# Patient Record
Sex: MALE | Race: WHITE | NOT HISPANIC OR LATINO | Employment: OTHER | URBAN - METROPOLITAN AREA
[De-identification: names, ages, dates, MRNs, and addresses within clinical notes are randomized per-mention and may not be internally consistent; named-entity substitution may affect disease eponyms.]

---

## 2022-12-29 ENCOUNTER — HOSPITAL ENCOUNTER (OUTPATIENT)
Dept: CARDIOLOGY | Facility: HOSPITAL | Age: 77
Discharge: HOME | End: 2022-12-29
Payer: MEDICARE

## 2022-12-29 ENCOUNTER — LAB (OUTPATIENT)
Dept: LAB | Facility: HOSPITAL | Age: 77
End: 2022-12-29
Payer: MEDICARE

## 2022-12-29 ENCOUNTER — OFFICE VISIT (OUTPATIENT)
Dept: CARDIOLOGY | Facility: HOSPITAL | Age: 77
End: 2022-12-29
Payer: MEDICARE

## 2022-12-29 VITALS
RESPIRATION RATE: 18 BRPM | TEMPERATURE: 97.8 F | OXYGEN SATURATION: 98 % | HEIGHT: 70 IN | WEIGHT: 200.25 LBS | BODY MASS INDEX: 28.67 KG/M2 | HEART RATE: 73 BPM | SYSTOLIC BLOOD PRESSURE: 189 MMHG | DIASTOLIC BLOOD PRESSURE: 81 MMHG

## 2022-12-29 VITALS — HEIGHT: 70 IN | WEIGHT: 200 LBS | BODY MASS INDEX: 28.63 KG/M2

## 2022-12-29 VITALS — HEIGHT: 70 IN | WEIGHT: 199.96 LBS | BODY MASS INDEX: 28.63 KG/M2

## 2022-12-29 DIAGNOSIS — R60.0 LOWER EXTREMITY EDEMA: ICD-10-CM

## 2022-12-29 DIAGNOSIS — I67.841 REVERSIBLE CEREBROVASCULAR VASOCONSTRICTION SYNDROME: ICD-10-CM

## 2022-12-29 DIAGNOSIS — I63.9 CEREBROVASCULAR ACCIDENT (CVA), UNSPECIFIED MECHANISM: ICD-10-CM

## 2022-12-29 DIAGNOSIS — I63.9 CEREBROVASCULAR ACCIDENT (CVA), UNSPECIFIED MECHANISM: Primary | ICD-10-CM

## 2022-12-29 DIAGNOSIS — I10 ESSENTIAL HYPERTENSION: ICD-10-CM

## 2022-12-29 DIAGNOSIS — G45.9 TIA (TRANSIENT ISCHEMIC ATTACK): ICD-10-CM

## 2022-12-29 DIAGNOSIS — G45.9 TIA (TRANSIENT ISCHEMIC ATTACK): Primary | ICD-10-CM

## 2022-12-29 LAB
ALBUMIN SERPL-MCNC: 4.4 G/DL (ref 3.5–5.2)
ALBUMIN/GLOB SERPL: 1.9 G/DL
ALP SERPL-CCNC: 122 U/L (ref 39–117)
ALT SERPL W P-5'-P-CCNC: 12 U/L (ref 1–41)
ANION GAP SERPL CALCULATED.3IONS-SCNC: 11.3 MMOL/L (ref 5–15)
AST SERPL-CCNC: 13 U/L (ref 1–40)
BH CV ECHO MEAS - AO MAX PG: 8.6 MMHG
BH CV ECHO MEAS - AO MEAN PG: 5 MMHG
BH CV ECHO MEAS - AO ROOT DIAM: 3.3 CM
BH CV ECHO MEAS - AO V2 MAX: 146.3 CM/SEC
BH CV ECHO MEAS - AO V2 VTI: 29.9 CM
BH CV ECHO MEAS - AVA(I,D): 2.01 CM2
BH CV ECHO MEAS - EDV(CUBED): 82.3 ML
BH CV ECHO MEAS - EDV(MOD-SP2): 97.5 ML
BH CV ECHO MEAS - EDV(MOD-SP4): 107 ML
BH CV ECHO MEAS - EF(MOD-BP): 50.4 %
BH CV ECHO MEAS - EF(MOD-SP2): 41.7 %
BH CV ECHO MEAS - EF(MOD-SP4): 53.7 %
BH CV ECHO MEAS - ESV(CUBED): 22.2 ML
BH CV ECHO MEAS - ESV(MOD-SP2): 56.8 ML
BH CV ECHO MEAS - ESV(MOD-SP4): 49.5 ML
BH CV ECHO MEAS - FS: 35.4 %
BH CV ECHO MEAS - IVS/LVPW: 1.07 CM
BH CV ECHO MEAS - IVSD: 0.98 CM
BH CV ECHO MEAS - LA DIMENSION: 4.1 CM
BH CV ECHO MEAS - LAT PEAK E' VEL: 6.4 CM/SEC
BH CV ECHO MEAS - LV DIASTOLIC VOL/BSA (35-75): 51.3 CM2
BH CV ECHO MEAS - LV MASS(C)D: 135.4 GRAMS
BH CV ECHO MEAS - LV MAX PG: 3.7 MMHG
BH CV ECHO MEAS - LV MEAN PG: 1.7 MMHG
BH CV ECHO MEAS - LV SYSTOLIC VOL/BSA (12-30): 23.7 CM2
BH CV ECHO MEAS - LV V1 MAX: 95.5 CM/SEC
BH CV ECHO MEAS - LV V1 VTI: 19 CM
BH CV ECHO MEAS - LVIDD: 4.3 CM
BH CV ECHO MEAS - LVIDS: 2.8 CM
BH CV ECHO MEAS - LVOT AREA: 3.2 CM2
BH CV ECHO MEAS - LVOT DIAM: 2.01 CM
BH CV ECHO MEAS - LVPWD: 0.92 CM
BH CV ECHO MEAS - MED PEAK E' VEL: 6.1 CM/SEC
BH CV ECHO MEAS - MV A MAX VEL: 100.7 CM/SEC
BH CV ECHO MEAS - MV DEC SLOPE: 321.9 CM/SEC2
BH CV ECHO MEAS - MV DEC TIME: 0.23 MSEC
BH CV ECHO MEAS - MV E MAX VEL: 58.7 CM/SEC
BH CV ECHO MEAS - MV E/A: 0.58
BH CV ECHO MEAS - MV MAX PG: 4.9 MMHG
BH CV ECHO MEAS - MV MEAN PG: 1.9 MMHG
BH CV ECHO MEAS - MV P1/2T: 79.7 MSEC
BH CV ECHO MEAS - MV V2 VTI: 21.9 CM
BH CV ECHO MEAS - MVA(P1/2T): 2.8 CM2
BH CV ECHO MEAS - MVA(VTI): 2.8 CM2
BH CV ECHO MEAS - PA ACC TIME: 0.12 SEC
BH CV ECHO MEAS - PA PR(ACCEL): 26.7 MMHG
BH CV ECHO MEAS - PA V2 MAX: 163.8 CM/SEC
BH CV ECHO MEAS - RAP SYSTOLE: 3 MMHG
BH CV ECHO MEAS - RVSP: 12 MMHG
BH CV ECHO MEAS - SI(MOD-SP2): 19.5 ML/M2
BH CV ECHO MEAS - SI(MOD-SP4): 27.5 ML/M2
BH CV ECHO MEAS - SV(LVOT): 60.2 ML
BH CV ECHO MEAS - SV(MOD-SP2): 40.7 ML
BH CV ECHO MEAS - SV(MOD-SP4): 57.5 ML
BH CV ECHO MEAS - TAPSE (>1.6): 2.03 CM
BH CV ECHO MEAS - TR MAX PG: 9.1 MMHG
BH CV ECHO MEAS - TR MAX VEL: 150.7 CM/SEC
BH CV ECHO MEASUREMENTS AVERAGE E/E' RATIO: 9.39
BH CV LOWER VASCULAR LEFT COMMON FEMORAL AUGMENT: NORMAL
BH CV LOWER VASCULAR LEFT COMMON FEMORAL COMPRESS: NORMAL
BH CV LOWER VASCULAR LEFT COMMON FEMORAL PHASIC: NORMAL
BH CV LOWER VASCULAR LEFT COMMON FEMORAL SPONT: NORMAL
BH CV LOWER VASCULAR LEFT DISTAL FEMORAL AUGMENT: NORMAL
BH CV LOWER VASCULAR LEFT DISTAL FEMORAL COMPRESS: NORMAL
BH CV LOWER VASCULAR LEFT DISTAL FEMORAL PHASIC: NORMAL
BH CV LOWER VASCULAR LEFT DISTAL FEMORAL SPONT: NORMAL
BH CV LOWER VASCULAR LEFT GASTRONEMIUS COMPRESS: NORMAL
BH CV LOWER VASCULAR LEFT GREATER SAPH AK COMPRESS: NORMAL
BH CV LOWER VASCULAR LEFT GREATER SAPH BK COMPRESS: NORMAL
BH CV LOWER VASCULAR LEFT LESSER SAPH COMPRESS: NORMAL
BH CV LOWER VASCULAR LEFT MID FEMORAL AUGMENT: NORMAL
BH CV LOWER VASCULAR LEFT MID FEMORAL COMPRESS: NORMAL
BH CV LOWER VASCULAR LEFT MID FEMORAL PHASIC: NORMAL
BH CV LOWER VASCULAR LEFT MID FEMORAL SPONT: NORMAL
BH CV LOWER VASCULAR LEFT PERONEAL COMPRESS: NORMAL
BH CV LOWER VASCULAR LEFT POPLITEAL AUGMENT: NORMAL
BH CV LOWER VASCULAR LEFT POPLITEAL COMPRESS: NORMAL
BH CV LOWER VASCULAR LEFT POPLITEAL PHASIC: NORMAL
BH CV LOWER VASCULAR LEFT POPLITEAL SPONT: NORMAL
BH CV LOWER VASCULAR LEFT POSTERIOR TIBIAL COMPRESS: NORMAL
BH CV LOWER VASCULAR LEFT PROFUNDA FEMORAL PHASIC: NORMAL
BH CV LOWER VASCULAR LEFT PROFUNDA FEMORAL SPONT: NORMAL
BH CV LOWER VASCULAR LEFT PROXIMAL FEMORAL AUGMENT: NORMAL
BH CV LOWER VASCULAR LEFT PROXIMAL FEMORAL COMPRESS: NORMAL
BH CV LOWER VASCULAR LEFT PROXIMAL FEMORAL PHASIC: NORMAL
BH CV LOWER VASCULAR LEFT PROXIMAL FEMORAL SPONT: NORMAL
BH CV LOWER VASCULAR LEFT SAPHENOFEMORAL JUNCTION AUGMENT: NORMAL
BH CV LOWER VASCULAR LEFT SAPHENOFEMORAL JUNCTION COMPRESS: NORMAL
BH CV LOWER VASCULAR LEFT SAPHENOFEMORAL JUNCTION PHASIC: NORMAL
BH CV LOWER VASCULAR LEFT SAPHENOFEMORAL JUNCTION SPONT: NORMAL
BH CV LOWER VASCULAR RIGHT COMMON FEMORAL AUGMENT: NORMAL
BH CV LOWER VASCULAR RIGHT COMMON FEMORAL COMPRESS: NORMAL
BH CV LOWER VASCULAR RIGHT COMMON FEMORAL PHASIC: NORMAL
BH CV LOWER VASCULAR RIGHT COMMON FEMORAL SPONT: NORMAL
BH CV LOWER VASCULAR RIGHT DISTAL FEMORAL AUGMENT: NORMAL
BH CV LOWER VASCULAR RIGHT DISTAL FEMORAL COMPRESS: NORMAL
BH CV LOWER VASCULAR RIGHT DISTAL FEMORAL PHASIC: NORMAL
BH CV LOWER VASCULAR RIGHT DISTAL FEMORAL SPONT: NORMAL
BH CV LOWER VASCULAR RIGHT GASTRONEMIUS COMPRESS: NORMAL
BH CV LOWER VASCULAR RIGHT GREATER SAPH AK COMPRESS: NORMAL
BH CV LOWER VASCULAR RIGHT GREATER SAPH BK COMPRESS: NORMAL
BH CV LOWER VASCULAR RIGHT LESSER SAPH COMPRESS: NORMAL
BH CV LOWER VASCULAR RIGHT MID FEMORAL AUGMENT: NORMAL
BH CV LOWER VASCULAR RIGHT MID FEMORAL COMPRESS: NORMAL
BH CV LOWER VASCULAR RIGHT MID FEMORAL PHASIC: NORMAL
BH CV LOWER VASCULAR RIGHT MID FEMORAL SPONT: NORMAL
BH CV LOWER VASCULAR RIGHT PERONEAL COMPRESS: NORMAL
BH CV LOWER VASCULAR RIGHT POPLITEAL AUGMENT: NORMAL
BH CV LOWER VASCULAR RIGHT POPLITEAL COMPRESS: NORMAL
BH CV LOWER VASCULAR RIGHT POPLITEAL PHASIC: NORMAL
BH CV LOWER VASCULAR RIGHT POPLITEAL SPONT: NORMAL
BH CV LOWER VASCULAR RIGHT POSTERIOR TIBIAL COMPRESS: NORMAL
BH CV LOWER VASCULAR RIGHT PROFUNDA FEMORAL AUGMENT: NORMAL
BH CV LOWER VASCULAR RIGHT PROFUNDA FEMORAL PHASIC: NORMAL
BH CV LOWER VASCULAR RIGHT PROFUNDA FEMORAL SPONT: NORMAL
BH CV LOWER VASCULAR RIGHT PROXIMAL FEMORAL AUGMENT: NORMAL
BH CV LOWER VASCULAR RIGHT PROXIMAL FEMORAL COMPRESS: NORMAL
BH CV LOWER VASCULAR RIGHT PROXIMAL FEMORAL PHASIC: NORMAL
BH CV LOWER VASCULAR RIGHT PROXIMAL FEMORAL SPONT: NORMAL
BH CV LOWER VASCULAR RIGHT SAPHENOFEMORAL JUNCTION AUGMENT: NORMAL
BH CV LOWER VASCULAR RIGHT SAPHENOFEMORAL JUNCTION COMPRESS: NORMAL
BH CV LOWER VASCULAR RIGHT SAPHENOFEMORAL JUNCTION PHASIC: NORMAL
BH CV LOWER VASCULAR RIGHT SAPHENOFEMORAL JUNCTION SPONT: NORMAL
BH CV VAS BP RIGHT ARM: NORMAL MMHG
BH CV XLRA - RV BASE: 3.2 CM
BH CV XLRA - RV LENGTH: 8 CM
BH CV XLRA - RV MID: 3.1 CM
BH CV XLRA - TDI S': 9.2 CM/SEC
BILIRUB SERPL-MCNC: 0.3 MG/DL (ref 0–1.2)
BUN SERPL-MCNC: 17 MG/DL (ref 8–23)
BUN/CREAT SERPL: 11.3 (ref 7–25)
CALCIUM SPEC-SCNC: 8.8 MG/DL (ref 8.6–10.5)
CHLORIDE SERPL-SCNC: 98 MMOL/L (ref 98–107)
CO2 SERPL-SCNC: 27.7 MMOL/L (ref 22–29)
CREAT SERPL-MCNC: 1.5 MG/DL (ref 0.76–1.27)
EGFRCR SERPLBLD CKD-EPI 2021: 47.7 ML/MIN/1.73
GLOBULIN UR ELPH-MCNC: 2.3 GM/DL
GLUCOSE SERPL-MCNC: 281 MG/DL (ref 65–99)
LEFT ATRIUM VOLUME INDEX: 19.6 ML/M2
MAXIMAL PREDICTED HEART RATE: 143 BPM
MAXIMAL PREDICTED HEART RATE: 143 BPM
POTASSIUM SERPL-SCNC: 4.1 MMOL/L (ref 3.5–5.2)
PROT SERPL-MCNC: 6.7 G/DL (ref 6–8.5)
QT INTERVAL: 418 MS
QTC INTERVAL: 457 MS
SODIUM SERPL-SCNC: 137 MMOL/L (ref 136–145)
STRESS TARGET HR: 122 BPM
STRESS TARGET HR: 122 BPM

## 2022-12-29 PROCEDURE — 93306 TTE W/DOPPLER COMPLETE: CPT

## 2022-12-29 PROCEDURE — 99205 OFFICE O/P NEW HI 60 MIN: CPT | Performed by: NURSE PRACTITIONER

## 2022-12-29 PROCEDURE — 36415 COLL VENOUS BLD VENIPUNCTURE: CPT

## 2022-12-29 PROCEDURE — 93010 ELECTROCARDIOGRAM REPORT: CPT | Performed by: STUDENT IN AN ORGANIZED HEALTH CARE EDUCATION/TRAINING PROGRAM

## 2022-12-29 PROCEDURE — 93005 ELECTROCARDIOGRAM TRACING: CPT | Performed by: NURSE PRACTITIONER

## 2022-12-29 PROCEDURE — 93970 EXTREMITY STUDY: CPT | Performed by: INTERNAL MEDICINE

## 2022-12-29 PROCEDURE — 93306 TTE W/DOPPLER COMPLETE: CPT | Performed by: INTERNAL MEDICINE

## 2022-12-29 PROCEDURE — 80053 COMPREHEN METABOLIC PANEL: CPT

## 2022-12-29 PROCEDURE — 93970 EXTREMITY STUDY: CPT

## 2022-12-29 RX ORDER — CHLORTHALIDONE 25 MG/1
25 TABLET ORAL DAILY
COMMUNITY
Start: 2022-12-27

## 2022-12-29 RX ORDER — DILTIAZEM HYDROCHLORIDE 180 MG/1
180 CAPSULE, COATED, EXTENDED RELEASE ORAL DAILY
COMMUNITY
Start: 2022-12-19

## 2022-12-29 RX ORDER — HYDRALAZINE HYDROCHLORIDE 50 MG/1
50 TABLET, FILM COATED ORAL EVERY 12 HOURS
Qty: 60 TABLET | Refills: 1 | Status: SHIPPED | OUTPATIENT
Start: 2022-12-29 | End: 2022-12-31

## 2022-12-29 RX ORDER — LISINOPRIL 20 MG/1
20 TABLET ORAL DAILY
COMMUNITY
Start: 2022-12-06

## 2022-12-29 RX ORDER — ATORVASTATIN CALCIUM 80 MG/1
80 TABLET, FILM COATED ORAL DAILY
Qty: 90 TABLET | Refills: 1 | Status: SHIPPED | OUTPATIENT
Start: 2022-12-29

## 2022-12-29 RX ORDER — CLOPIDOGREL BISULFATE 75 MG/1
75 TABLET ORAL DAILY
Qty: 90 TABLET | Refills: 0 | Status: SHIPPED | OUTPATIENT
Start: 2022-12-29 | End: 2023-04-05 | Stop reason: SDUPTHER

## 2022-12-29 RX ORDER — ASPIRIN 81 MG/1
81 TABLET ORAL DAILY
Qty: 90 TABLET | Refills: 1 | Status: SHIPPED | OUTPATIENT
Start: 2022-12-29

## 2022-12-29 NOTE — PATIENT INSTRUCTIONS
- Lab work on 7th floor    - Office will schedule testing:  echocardiogram and leg ultrasound    - Office will call with testing results    -Start aspirin, plavix, atorvastatin for stroke.   Start hydralazine 50mg every 12 hours for blood pressure    -Cardiology and Neurology will call to schedule an appointment

## 2022-12-29 NOTE — PROGRESS NOTES
[facility] Heart Monitor Documentation    Gadiel Khan  1945  [id number]  12/29/22      [] ZIO XT Patch  Model [id number] Prescribed for  Days    · Serial Number: (N + 9 Digits) N   · Apply-By Date on Box:   · USPS Tracking Number:   · USPS Tracking        [x] [employer]ian MINI PLUS Mobile Cardiac Telemetry  Model BGMINIPLUS Prescribed for 30 Days    · Serial Number: (BGM + 7 Digits) [id number]  · Shipped-By Date on Box: 12/15/2022  · UPS Tracking Number: [id number]  · UPS Tracking      [] [employer] MINI Holter Monitor  Model BGMINIEL Prescribed for  Days    · Serial Number: (7 Digits)   · Shipped-By Date on Box:   · UPS Tracking Number: [id number]  · UPS Tracking        This monitor was applied to the patient's chest and checked for proper functioning.  Mr. Gadiel Khan was instructed in the proper use of this monitor.  He was given the opportunity to ask questions and left the office with the device 's instruction manual.    Alexa Broussard, [facility], 12:38 EST, 12/29/22                  [facility]

## 2022-12-29 NOTE — PROGRESS NOTES
Chief Complaint  Hypertension and Establish Care    Subjective      History of Present Illness {CC  Problem List  Visit  Diagnosis   Encounters  Notes  Medications  Labs  Result Review Imaging  Media :23}     Gadiel Khan, 77 y.o. male with CVA, HTN, T2DM, renal insufficiency (unknown chronicity) presents to [facility] Heart and Valve clinic for Hypertension and Establish Care.    Patient recently hospitalized at [facility] 12/5/2022 with sudden onset right leg numbness, sensory loss. Record review indicated remote CVA, also mention radiologist indicated small insular cortex stroke. Ct report with acute infarct involving left cortex. Neck CT with mild atherosclerosis. Neurology recommendation for ASA/clopidogrel and atorvastatin 80 Mg daily. Documentation shows patient left AMA. Patient completed follow-up with PCP and chlorthalidone initiated for further blood pressure control, lab work not completed.     Patient presents today feeling well overall from a cardiovascular standpoint, but blood pressures continue to be quite elevated at home. Neurologic symptomns have now resolved, denies noticeable residual symptoms.  Blood pressure elevated on home monitoring; SBP range 143-207, generally 160-190s. He completed recent follow-up with his PCP and chlorthalidone initiated. He is currently not on ASA/plavix/statin as recommended by neurology during recent hospitalization. No neurology follow-up since hospital or scheduled currently. He is overall asymptomatic from a cardiovascular standpoint; denies chest pain, dyspnea, palpitation/tachypalpitation, near syncope/syncope.        Objective     Vital Signs:   Vitals:    12/29/22 1107 12/29/22 1110 12/29/22 1112   BP: (!) 195/89 175/77 (!) 189/81   BP Location: Right arm Left arm Left arm   Patient Position: Sitting Standing Sitting   Cuff Size: Adult Adult Adult   Pulse: 72 75 73   Resp:   18   Temp:   97.8 °F (36.6 °C)   TempSrc:   Temporal   SpO2: 97%  "98% 98%   Weight:   90.8 kg (200 lb 4 oz)   Height:   177.8 cm (70\")     Body mass index is 28.73 kg/m².  Physical Exam  Vitals and nursing note reviewed.   Constitutional:       Appearance: Normal appearance.   HENT:      Head: Normocephalic.   Eyes:      Extraocular Movements: Extraocular movements intact.   Neck:      Vascular: No carotid bruit.   Cardiovascular:      Rate and Rhythm: Normal rate and regular rhythm.      Pulses: Normal pulses.      Heart sounds: Normal heart sounds, S1 normal and S2 normal. No murmur heard.  Pulmonary:      Effort: Pulmonary effort is normal. No respiratory distress.      Breath sounds: Normal breath sounds.   Musculoskeletal:      Cervical back: Neck supple.      Right lower leg: Edema present.      Left lower leg: Edema present.      Comments: 2+right, 1+left lower extremity edema. Appears to be venous stasis skin lesions bilateral.    Skin:     General: Skin is warm and dry.   Neurological:      General: No focal deficit present.      Mental Status: He is alert.   Psychiatric:         Mood and Affect: Mood normal.         Behavior: Behavior normal.         Thought Content: Thought content normal.        Data Reviewed:{ Labs  Result Review  Imaging  Med Tab  Media :23}     - [facility] hospital records reviewed from admission 12/5/2022:  -WBC 6.8, hemoglobin 12.3/hematocrit 36.3, platelets 164.  Sodium 139, potassium 4.0, creatinine 1.57.   -Neurology/ED progress note reviewed  ECG 12 Lead (12/29/2022 11:19)    Assessment & Plan   Assessment and Plan {CC Problem List  Visit Diagnosis  ROS  Review (Popup)  Health Maintenance  Quality  BestPractice  Medications  SmartSets  SnapShot Encounters  Media :23}     1. Cerebrovascular accident (CVA), unspecified mechanism (HCC)  -Recently hospitalized at [facility] 12/5/2022 with sudden onset right leg numbness, sensory loss. Record review indicated remote CVA, also mention radiologist indicated small insular cortex stroke. "   -Ct report with acute infarct involving left cortex. Neck CT with mild atherosclerosis.   -Initiate ASA/clopidogrel/atorvastatin  -Urgent referral to neurology (no follow-up since hospitalization)  -TTE with bubble study  -30 day MCOT for arrhythmia surveillance  -Requests referral to  cardiology, Dr. Parker  -Further blood pressure control outlined below    2. Essential hypertension  -Elevated on home monitoring and at time of visit  -Recent initiation of chlorthalidone by PCP and increase of lisinopril  -Initiate hydralazine 50mg BID given continued elevation  -Continue close home BP monitoring  -CMP today for renal function assessment, unknown if eleavted creatinine is new or chronic  - Ambulatory Referral to Cardiology    3. Lower extremity edema  -Right greater than left LE edema  -Notes slight improvement with chlorthalidone  -TTE as above for functional assessment  -Bilateral venous duplex to r/o DVT  -Continue chlorthalidone, BP control  -Follow-up in one week for BP/symptom check; may initiate furosemide if edema persists    4. Diabetes Mellitus  -Instructed to follow-up with PCP  -May consider SGLT2i pending TTE      Follow Up {Instructions Charge Capture  Follow-up Communications :23}     Return in about 8 days (around 1/6/2023) for Telephone visit; , BP check.    Time Spent: I spent 64 minutes caring for Gadiel Khan on this date of service, 51 minutes in exam room for extensive discussion with patient/family. This time includes time spent by me in the following activities: preparing for the visit, reviewing tests, obtaining and/or reviewing a separately obtained history, performing a medically appropriate examination and/or evaluation, counseling and educating the patient/family/caregiver, documenting information in the medical record and independently interpreting results and communicating that information with the patient/family/caregiver. All time noted occurred on the date of  service.    Patient was given instructions and counseling regarding his condition or for health maintenance advice. Please see specific information pulled into the AVS if appropriate.  Patient was instructed to call the Heart and Valve Center with any questions, concerns, or worsening symptoms.    Dictated Utilizing Dragon Dictation   Please note that portions of this note were completed with a voice recognition program.   Part of this note may be an electronic transcription/translation of spoken language to printed text using the Dragon Dictation System.

## 2022-12-31 ENCOUNTER — DOCUMENTATION (OUTPATIENT)
Dept: CARDIOLOGY | Facility: HOSPITAL | Age: 77
End: 2022-12-31

## 2022-12-31 DIAGNOSIS — I63.9 CEREBROVASCULAR ACCIDENT (CVA), UNSPECIFIED MECHANISM: ICD-10-CM

## 2022-12-31 DIAGNOSIS — I10 ESSENTIAL HYPERTENSION: ICD-10-CM

## 2022-12-31 RX ORDER — HYDRALAZINE HYDROCHLORIDE 50 MG/1
50 TABLET, FILM COATED ORAL 2 TIMES DAILY PRN
Qty: 60 TABLET | Refills: 1
Start: 2022-12-31 | End: 2023-01-10 | Stop reason: SDUPTHER

## 2022-12-31 NOTE — PROGRESS NOTES
Contacted patient yesterday regarding testing results. Blood pressure significantly improved with recent chlorthalidone; SBP 120s yesterday morning. Instructed to change hydralazine to PRN for SBP greater than 160. Continue close monitoring of blood pressure, contact [facility] if SBP less than 110/greater than 160. Continue close follow-up as scheduled.

## 2023-01-10 ENCOUNTER — OFFICE VISIT (OUTPATIENT)
Dept: CARDIOLOGY | Facility: HOSPITAL | Age: 78
End: 2023-01-10
Payer: MEDICARE

## 2023-01-10 VITALS — SYSTOLIC BLOOD PRESSURE: 145 MMHG | DIASTOLIC BLOOD PRESSURE: 86 MMHG

## 2023-01-10 DIAGNOSIS — I10 ESSENTIAL HYPERTENSION: ICD-10-CM

## 2023-01-10 DIAGNOSIS — I63.9 CEREBROVASCULAR ACCIDENT (CVA), UNSPECIFIED MECHANISM: ICD-10-CM

## 2023-01-10 DIAGNOSIS — R60.0 LOWER EXTREMITY EDEMA: Primary | ICD-10-CM

## 2023-01-10 PROCEDURE — 99443 PR PHYS/QHP TELEPHONE EVALUATION 21-30 MIN: CPT | Performed by: NURSE PRACTITIONER

## 2023-01-10 RX ORDER — HYDRALAZINE HYDROCHLORIDE 50 MG/1
25 TABLET, FILM COATED ORAL EVERY 12 HOURS
Start: 2023-01-10 | End: 2023-01-20 | Stop reason: SDUPTHER

## 2023-01-10 NOTE — PROGRESS NOTES
Mode of visit: Telephone  Location of patient: Home   You have chosen to receive care through the use of telemedicine. Telemedicine enables health care providers at different locations to provide safe, effective, and convenient care through the use of technology. As with any health care service, there are risks associated with the use of telemedicine, including equipment failure, poor connections, and  issues.  • Do you understand the risks and benefits of telemedicine as I have explained them to you? Yes  • Have your questions regarding telemedicine been answered? Yes  • Do you consent to the use of telemedicine in your medical care today? Yes      Chief Complaint  Hypertension (Follow-up)    [facility]  Heart and Valve Center  Telemedicine note    This was a telephone enabled telemedicine encounter.    Subjective    History of Present Illness {CC  Problem List  Visit  Diagnosis   Encounters  Notes  Medications  Labs  Result Review Imaging  Media :23}     Gadiel Khan, 77 y.o. male with CVA, HTN, T2DM, renal insufficiency (unknown chronicity) presents to [facility] Heart and Valve telemedicine visit for Hypertension (Follow-up).    Patient recently hospitalized at [facility] 12/5/2022 with sudden onset right leg numbness, sensory loss. Record review indicated remote CVA, also mention radiologist indicated small insular cortex stroke. Ct report with acute infarct involving left cortex. Neck CT with mild atherosclerosis. Neurology recommendation for ASA/clopidogrel and atorvastatin 80 Mg daily. Documentation shows patient left AMA. Patient completed establishing care at heart and valve noting LE edema and elevated blood pressure. Patient was initiated on ASA/clopidogrel/statin at previous evaluation and referred to cardiology/neurology. Since previous evaluation patient complete TTE that revealed preserved LVEF, bubble study negative of atrial shunt, no significant  valvular abnormality. Bilateral LE duplex with normal bilateral LE venous duplex. Patient contacted with results and he reported blood pressure improved with SBP in 120s; instructed to not start hydralazine that was prescribed at [facility] appointment.     Patient presents today for telemedicine visit and reports that lower extremity edema has improved quite a bit since previous evaluation. Denies chest pain, shortness of breath, tachypalpitation, and near syncope/syncope. Since previous evaluation patient was diagnosed with COVID-19 approximately one week ago; overall asymptomatic with COVID and denies dyspnea. SBP on home monitoring generally 130-140s.         Objective     Vital Signs:   Vitals:    01/09/23 1200   BP: 145/86     There is no height or weight on file to calculate BMI.  Physical Exam  Vitals reviewed.   Constitutional:       Appearance: Normal appearance.   HENT:      Head: Normocephalic.   Pulmonary:      Effort: Pulmonary effort is normal. No respiratory distress.   Neurological:      Mental Status: He is alert and oriented to person, place, and time.   Psychiatric:         Mood and Affect: Mood normal.         Behavior: Behavior normal.         Thought Content: Thought content normal.        Data Reviewed:{ Labs  Result Review  Imaging  Med Tab  Media :23}     Comprehensive Metabolic Panel (12/29/2022 14:17)    Adult Transthoracic Echo Complete W/ Cont if Necessary Per Protocol (12/29/2022 16:17)  Duplex Venous Lower Extremity - Bilateral CAR (12/29/2022 17:25)  [facility]01/02/2023)  PROGRESS NOTES - SCAN - PROG NOTE-[facility]-01.02.23 (01/02/2023)      Assessment and Plan {CC Problem List  Visit Diagnosis  ROS  Review (Popup)  Health Maintenance  Quality  BestPractice  Medications  SmartSets  SnapShot Encounters  Media :23}     This visit has been scheduled as a telephone visit to comply with patient safety concerns in accordance with [facility]  recommendations. Time of discussion: 28 minutes.    1. Cerebrovascular accident (CVA), unspecified mechanism (HCC)  -Recently hospitalized at [facility] 12/5/2022 with sudden onset right leg numbness, sensory loss. Record review indicated remote CVA, also mention radiologist indicated small insular cortex stroke.   -Ct report with acute infarct involving left cortex. Neck CT with mild atherosclerosis.   -Continue recently initiated ASA/clopidogrel/atorvastatin  -Urgent referral to neurology (no follow-up since hospitalization)  -TTE with preserved LVEF, bubble study negative for atrial shunt  -30 day MCOT for arrhythmia surveillance  -Continue with requested referral to  cardiology, Dr. Parker  -Blood pressure improved, still slight above goal    2. Essential hypertension  -Improved, but still above goal. -140s on home monitoring.   -Recent initiation of chlorthalidone by PCP and increase of lisinopril  -Initiate hydralazine 25mg q12h given continued elevation  -Continue close home BP monitoring  -f/u in 2 weeks for telemed due to travel distance    3. Lower extremity edema  -Improved since previous evaluation.   -Recent negative venous duplex study.         Follow Up {Instructions Charge Capture  Follow-up Communications :23}   Return in about 10 days (around 1/20/2023) for Telephone visit; , BP check.      Patient was given instructions and counseling regarding his condition or for health maintenance advice. Please see specific information pulled into the AVS if appropriate.  Patient was instructed to call the Heart and Valve Center with any questions, concerns, or worsening symptoms.    *Please note that portions of this note were completed with a voice recognition program. Efforts were made to edit the dictations, but occasionally words are mistranscribed.

## 2023-01-20 ENCOUNTER — OFFICE VISIT (OUTPATIENT)
Dept: CARDIOLOGY | Facility: HOSPITAL | Age: 78
End: 2023-01-20
Payer: MEDICARE

## 2023-01-20 VITALS — SYSTOLIC BLOOD PRESSURE: 156 MMHG | DIASTOLIC BLOOD PRESSURE: 86 MMHG

## 2023-01-20 DIAGNOSIS — I63.9 CEREBROVASCULAR ACCIDENT (CVA), UNSPECIFIED MECHANISM: ICD-10-CM

## 2023-01-20 DIAGNOSIS — I10 ESSENTIAL HYPERTENSION: ICD-10-CM

## 2023-01-20 PROCEDURE — 99443 PR PHYS/QHP TELEPHONE EVALUATION 21-30 MIN: CPT | Performed by: NURSE PRACTITIONER

## 2023-01-20 RX ORDER — HYDRALAZINE HYDROCHLORIDE 50 MG/1
50 TABLET, FILM COATED ORAL EVERY 12 HOURS
Qty: 60 TABLET | Refills: 3 | Status: SHIPPED | OUTPATIENT
Start: 2023-01-20 | End: 2023-03-24 | Stop reason: SDUPTHER

## 2023-01-20 NOTE — PROGRESS NOTES
Mode of visit: Telephone  Location of patient: Home   You have chosen to receive care through the use of telemedicine. Telemedicine enables health care providers at different locations to provide safe, effective, and convenient care through the use of technology. As with any health care service, there are risks associated with the use of telemedicine, including equipment failure, poor connections, and  issues.  • Do you understand the risks and benefits of telemedicine as I have explained them to you? Yes  • Have your questions regarding telemedicine been answered? Yes  • Do you consent to the use of telemedicine in your medical care today? Yes      Chief Complaint  CVA/hypertension follow-up    [facility]  Heart and Valve Center  Telemedicine note    This was a telephone enabled telemedicine encounter.    Subjective    History of Present Illness {CC  Problem List  Visit  Diagnosis   Encounters  Notes  Medications  Labs  Result Review Imaging  Media :23}     Gadiel Khna, 77 y.o. male with CVA, HTN, T2DM,CKD presents to [facility] Heart and Valve telemedicine visit for CVA/hypertension follow-up.    Patient recently hospitalized at [facility] 12/5/2022 with sudden onset right leg numbness, sensory loss. Record review indicated remote CVA, also mention radiologist indicated small insular cortex stroke. Ct report with acute infarct involving left cortex. Neck CT with mild atherosclerosis. Neurology recommendation for ASA/clopidogrel and atorvastatin 80 Mg daily. Documentation shows patient left AMA. Patient completed establishing care at heart and valve noting LE edema and elevated blood pressure. Patient was initiated on ASA/clopidogrel/statin at previous evaluation and referred to cardiology/neurology. Since previous evaluation patient complete TTE that revealed preserved LVEF, bubble study negative of atrial shunt, no significant valvular abnormality. Bilateral  LE duplex with normal bilateral LE venous duplex.    Patient presents today for telemedicine visit and reports that lower extremity edema has improved since initial evaluation but blood pressure have increased since previous visit. He continues to recover from his recent COVID-19 infection, and essentially back to normal; walked 1 mile yesterday without chest pain/shortness of breath. Denies chest pain, shortness of breath, tachypalpitation, and near syncope/syncope. SBP on home monitoring 131-160s.       Objective     Vital Signs:   Vitals:    01/20/23 1059   BP: 156/86     There is no height or weight on file to calculate BMI.  Physical Exam  Vitals reviewed.   Constitutional:       Appearance: Normal appearance.   HENT:      Head: Normocephalic.   Pulmonary:      Effort: Pulmonary effort is normal. No respiratory distress.   Neurological:      Mental Status: He is alert and oriented to person, place, and time.   Psychiatric:         Mood and Affect: Mood normal.         Behavior: Behavior normal.         Thought Content: Thought content normal.        Data Reviewed:{ Labs  Result Review  Imaging  Med Tab  Media :23}     Comprehensive Metabolic Panel (12/29/2022 14:17)    Adult Transthoracic Echo Complete W/ Cont if Necessary Per Protocol (12/29/2022 16:17)  Duplex Venous Lower Extremity - Bilateral CAR (12/29/2022 17:25)  [facility] (01/02/2023)  [facility] (01/02/2023)      Assessment and Plan {CC Problem List  Visit Diagnosis  ROS  Review (Popup)  Health Maintenance  Quality  BestPractice  Medications  SmartSets  SnapShot Encounters  Media :23}     This visit has been scheduled as a telephone visit to comply with patient safety concerns in accordance with CDC recommendations. Time of discussion: 26 minutes.    1. Cerebrovascular accident (CVA), unspecified mechanism (HCC)  -Previously hospitalized at  [facility] 12/5/2022 with sudden onset right leg numbness, sensory loss. Record review indicated remote CVA, radiology report also indicated small insular cortex stroke.   -Ct report with acute infarct involving left cortex. Neck CT with mild atherosclerosis.   -Continue recently initiated ASA/clopidogrel/atorvastatin  -Previously referred to neurology (no follow-up since hospitalization)  -TTE with preserved LVEF, bubble study negative for atrial shunt  -30 day MCOT for arrhythmia surveillance, instructed to return on approximately 1 week  -Continue establishing care as requested with  cardiology, Dr. Parker  -Blood pressure improved, but still slight above goal    2. Essential hypertension  -Improved, but still above goal. -160s on home monitoring.   -Increase hydralazine to 50mg every 12 hours  -COntinue chlorthalidone/increased lisinopril previously prescribed by PCP   -Continue close home BP monitoring  -Continue home BP monitoring prior to establishing with Dr. Parker     3. Lower extremity edema  -Improved since initial evaluation.   -Recent negative venous duplex study.   -Continue chlorthalidone as previously prescribed      Follow Up {Instructions Charge Capture  Follow-up Communications :23}   Return if symptoms worsen or fail to improve.      Patient was given instructions and counseling regarding his condition or for health maintenance advice. Please see specific information pulled into the AVS if appropriate.  Patient was instructed to call the Heart and Valve Center with any questions, concerns, or worsening symptoms.    *Please note that portions of this note were completed with a voice recognition program. Efforts were made to edit the dictations, but occasionally words are mistranscribed.

## 2023-01-27 PROBLEM — I63.9 CVA (CEREBRAL VASCULAR ACCIDENT): Status: ACTIVE | Noted: 2023-01-27

## 2023-01-27 PROBLEM — I16.1 HYPERTENSIVE EMERGENCY: Status: ACTIVE | Noted: 2023-01-27

## 2023-01-27 PROBLEM — G45.9 TIA (TRANSIENT ISCHEMIC ATTACK): Status: ACTIVE | Noted: 2023-01-27

## 2023-01-27 PROBLEM — I10 ESSENTIAL HYPERTENSION: Status: ACTIVE | Noted: 2023-01-27

## 2023-01-27 PROBLEM — E78.5 HYPERLIPIDEMIA LDL GOAL <70: Status: ACTIVE | Noted: 2023-01-27

## 2023-01-27 NOTE — PROGRESS NOTES
OFFICE VISIT  NOTE  [facility] CARDIOLOGY MAIN CAMPUS      Name: Gadiel Khan    Date: 2023  MRN:  [id number]  :  1945      REFERRING/PRIMARY PROVIDER:  Kris Hood MD    Chief Complaint   Patient presents with   • Establish Care     Referred by IVIS Varner for CVA       HPI: Gadiel Khan is a 77 y.o. male who presents today for new consultation for CVA.  History of CVA likely TIA 2022 treated in [facility], MRI at that facility showed old left frontal infarct as well as right cerebellar infarct.  He had uncontrolled hypertension prior to this event, aspirin, Plavix, high intensity statin therapy atorvastatin 80 mg, restarted, he saw Roly Montero who initiated hydralazine which is helped his blood pressure tremendously.  His symptoms at time of TIA were right leg paralysis, they resolved within 20 minutes.  He walks 3 to 4 days/week without difficulty, denies chest pain or shortness of breath.    Past Medical History:   Diagnosis Date   • Diabetes (HCC)    • Hypertension    • Stroke (HCC)        Past Surgical History:   Procedure Laterality Date   • APPENDECTOMY     • CATARACT EXTRACTION, BILATERAL         Social History     Socioeconomic History   • Marital status: Single   Tobacco Use   • Smoking status: Never   • Smokeless tobacco: Never   Vaping Use   • Vaping Use: Never used   Substance and Sexual Activity   • Alcohol use: Not Currently   • Drug use: Never   • Sexual activity: Defer       Family History   Problem Relation Age of Onset   • Diabetes Mother    • Cancer Mother    • Cancer Father    • Asthma Father    • Cancer Sister    • No Known Problems Sister    • No Known Problems Brother    • No Known Problems Brother         ROS:   Constitutional no fever,  no weight loss   Skin no rash, no subcutaneous nodules   Otolaryngeal no difficulty swallowing   Cardiovascular See HPI   Pulmonary no cough, no sputum production   Gastrointestinal no constipation,  "no diarrhea   Genitourinary no dysuria, no hematuria   Hematologic no easy bruisability, no abnormal bleeding   Musculoskeletal no muscle pain   Neurologic no dizziness, no falls         No Known Allergies      Current Outpatient Medications:   •  aspirin 81 MG EC tablet, Take 1 tablet by mouth Daily., Disp: 90 tablet, Rfl: 1  •  atorvastatin (LIPITOR) 80 MG tablet, Take 1 tablet by mouth Daily., Disp: 90 tablet, Rfl: 1  •  chlorthalidone (HYGROTON) 25 MG tablet, Take 25 mg by mouth Daily., Disp: , Rfl:   •  clopidogrel (PLAVIX) 75 MG tablet, Take 1 tablet by mouth Daily., Disp: 90 tablet, Rfl: 0  •  dilTIAZem CD (CARDIZEM CD) 180 MG 24 hr capsule, Take 180 mg by mouth Daily., Disp: , Rfl:   •  hydrALAZINE (APRESOLINE) 50 MG tablet, Take 1 tablet by mouth Every 12 (Twelve) Hours., Disp: 60 tablet, Rfl: 3  •  lisinopril (PRINIVIL,ZESTRIL) 20 MG tablet, Take 20 mg by mouth Daily., Disp: , Rfl:   •  metFORMIN (GLUCOPHAGE) 500 MG tablet, Take 500 mg by mouth 2 (Two) Times a Day., Disp: , Rfl:     Vitals:    01/30/23 1259   BP: 135/74   BP Location: Right arm   Patient Position: Sitting   Cuff Size: Adult   Pulse: 74   SpO2: 97%   Weight: 88 kg (194 lb)   Height: 177.8 cm (70\")     Body mass index is 27.84 kg/m².    PHYSICAL EXAM:    General Appearance:   · well developed  · well nourished  HENT:   · oropharynx moist  · lips not cyanotic  Neck:  · thyroid not enlarged  · supple  Respiratory:  · no respiratory distress  · normal breath sounds  · no rales  Cardiovascular:  · no jugular venous distention  · regular rhythm  · apical impulse normal  · S1 normal, S2 normal  · no S3, no S4   · no murmur  · no rub, no thrill  · carotid pulses normal; no bruit  · lower extremity edema: none      Musculoskeletal:  · no clubbing of fingers.   · normocephalic, head atraumatic  Skin:   · warm, dry  Psychiatric:  · judgement and insight appropriate  · normal mood and affect    RESULTS:     ECG 12 Lead    Date/Time: 1/30/2023 1:34 " PM  Performed by: Bryant Parker MD  Authorized by: Bryant Parker MD   Comparison: compared with previous ECG from 12/29/2022  Similar to previous ECG  Rhythm: sinus rhythm  Rate: normal  QRS axis: normal    Clinical impression: non-specific ECG            Results for orders placed during the hospital encounter of 12/29/22    Adult Transthoracic Echo Complete W/ Cont if Necessary Per Protocol    Interpretation Summary  •  Calculated left ventricular EF = 50.4%  •  Left ventricular diastolic function is consistent with (grade I) impaired relaxation.  •  Saline test results are negative.  •  Estimated right ventricular systolic pressure from tricuspid regurgitation is normal (<35 mmHg). Calculated right ventricular systolic pressure from tricuspid regurgitation is 12 mmHg.        Labs:  Lab Results   Component Value Date    AST 13 12/29/2022    ALT 12 12/29/2022     CBC 12/5/22   RBC 4.24 L   WBC 6.83   Hemoglobin 12.3 L   Hematocrit 36.3 L   Platelet 164       Most recent PCP note, imaging tests, and labs reviewed.    ASSESSMENT:  Problem List Items Addressed This Visit        Cardiac and Vasculature    Essential hypertension    Hyperlipidemia LDL goal <70       Neuro    CVA (cerebral vascular accident) (HCC) - Primary    Overview     12/29/22 Echo: EF 50.4%, grade I impaired relaxation, negative saline test  12/5/22 CT head: Acute infarct involving left subinsular cortex  12/5/22 CTA head/neck: Patent common carotid, cervical internal carotid, and vertebral arteries bilaterally. Ill-defined hypodensities with/in left frontal and subinsular regions correlate for acute ischemic infarcts.          TIA (transient ischemic attack)    Overview     Involving [facility]            PLAN:    1.  CVA:  History of TIA 12/2022  Old infarcts noted on MRI, left and right hemisphere  Concerning for cardioembolic etiology, needs A. fib monitoring, just turned in 30-day M cot, will await results  Continue aspirin, Plavix,  atorvastatin  May consider loop recorder if nondiagnostic external monitor  Echo 12/2022 showed negative bubble study with EF 50%    2.  Hypertension:  Long-term goal blood pressure less than 140/90  Fairly well-controlled on current regimen, continue for now  Advised him to take his blood pressure once per day and document it    3.  Hyperlipidemia:  LDL goal less than 70  Continue torsemide 80 mg daily      Advance Care Planning   ACP discussion was held with the patient during this visit. Patient has an advance directive (not in EMR), copy requested.         Return to clinic in 9 months, or sooner as needed.    Thank you for the opportunity to share in the care of your patient; please do not hesitate to call me with any questions.     Bryant Parker MD, [facility]  Office: [phone] [address] [place] [address]    01/30/23

## 2023-01-30 ENCOUNTER — OFFICE VISIT (OUTPATIENT)
Dept: CARDIOLOGY | Facility: CLINIC | Age: 78
End: 2023-01-30
Payer: MEDICARE

## 2023-01-30 VITALS
WEIGHT: 194 LBS | OXYGEN SATURATION: 97 % | HEART RATE: 74 BPM | HEIGHT: 70 IN | BODY MASS INDEX: 27.77 KG/M2 | DIASTOLIC BLOOD PRESSURE: 74 MMHG | SYSTOLIC BLOOD PRESSURE: 135 MMHG

## 2023-01-30 DIAGNOSIS — I63.9 CEREBROVASCULAR ACCIDENT (CVA), UNSPECIFIED MECHANISM: Primary | ICD-10-CM

## 2023-01-30 DIAGNOSIS — I10 ESSENTIAL HYPERTENSION: ICD-10-CM

## 2023-01-30 DIAGNOSIS — E78.5 HYPERLIPIDEMIA LDL GOAL <70: ICD-10-CM

## 2023-01-30 DIAGNOSIS — G45.9 TIA (TRANSIENT ISCHEMIC ATTACK): ICD-10-CM

## 2023-01-30 PROCEDURE — 93000 ELECTROCARDIOGRAM COMPLETE: CPT | Performed by: INTERNAL MEDICINE

## 2023-01-30 PROCEDURE — 99214 OFFICE O/P EST MOD 30 MIN: CPT | Performed by: INTERNAL MEDICINE

## 2023-03-23 PROCEDURE — 93228 REMOTE 30 DAY ECG REV/REPORT: CPT | Performed by: INTERNAL MEDICINE

## 2023-03-24 DIAGNOSIS — I10 ESSENTIAL HYPERTENSION: Primary | ICD-10-CM

## 2023-03-24 DIAGNOSIS — I63.9 CEREBROVASCULAR ACCIDENT (CVA), UNSPECIFIED MECHANISM: ICD-10-CM

## 2023-03-24 RX ORDER — HYDRALAZINE HYDROCHLORIDE 50 MG/1
75 TABLET, FILM COATED ORAL EVERY 12 HOURS
Qty: 90 TABLET | Refills: 3 | Status: SHIPPED | OUTPATIENT
Start: 2023-03-24

## 2023-03-24 NOTE — PROGRESS NOTES
Contacted patient after previous cardiac monitor with 1 day of analysis data. Patient receptive of repeat 2 week monitor, will mail Zio for hopefully better arrhythmia surveillance.     Reports SBP generally 140-150s on home monitoring; improved overall, but still above goal. Instructed to increase hydralazine to 1.5 tabs every 12 hours. Continue routine home BP monitoring. Excited for upcoming turkey season.    Continue cardiology f/u as scheduled, and will contact him with monitor results when available. Contact heart and valve clinic if assistance is needed when he receives repeat cardiac monitor.

## 2023-03-27 ENCOUNTER — DOCUMENTATION (OUTPATIENT)
Dept: CARDIOLOGY | Facility: HOSPITAL | Age: 78
End: 2023-03-27
Payer: MEDICARE

## 2023-03-27 NOTE — PROGRESS NOTES
Previous cardiac monitor with only 1d of data; could you please mail patient 14d Zio monitor for repeat study. He would like to receive it ASAP before turkey hunting season. He will have his son assist with placement if needed, and also give us a call if further assistance is needed    Repeat cardiac monitor due to short scan of 1 day. Per provider, will send 14 day Zio.Son to help with placement.    [facility] Heart Monitor Documentation    Gadiel Khan  1945  [id number]  03/27/23      [x] ZIO XT Patch  Model [id number] Prescribed for 14 Days    · Serial Number: (N + 9 Digits) Home delivery   · Apply-By Date on Box:   · USPS Tracking Number:   · USPS Tracking        [] [employer] MINI PLUS Mobile Cardiac Telemetry  Model BGMINIPLUS Prescribed for  Days    · Serial Number: (BGM + 7 Digits) BGM  · Shipped-By Date on Box:   · UPS Tracking Number: 1Z  · UPS Tracking      [] [employer] MINI Holter Monitor  Model BGMINIEL Prescribed for  Days    · Serial Number: (7 Digits)   · Shipped-By Date on Box:   · UPS Tracking Number: 1Z  · UPS Tracking        This monitor was applied to the patient's chest and checked for proper functioning.  . Gadiel Khan was instructed in the proper use of this monitor.  He was given the opportunity to ask questions and left the office with the device 's instruction manual.    Alexa Broussard CMA, 08:40 EDT, 03/27/23                  [facility]

## 2023-03-30 ENCOUNTER — HOSPITAL ENCOUNTER (OUTPATIENT)
Dept: CARDIOLOGY | Facility: HOSPITAL | Age: 78
Discharge: HOME | End: 2023-03-30
Admitting: NURSE PRACTITIONER
Payer: MEDICARE

## 2023-03-30 DIAGNOSIS — I63.9 CEREBROVASCULAR ACCIDENT (CVA), UNSPECIFIED MECHANISM: ICD-10-CM

## 2023-03-30 PROCEDURE — 93246 EXT ECG>7D<15D RECORDING: CPT

## 2023-04-05 DIAGNOSIS — I63.9 CEREBROVASCULAR ACCIDENT (CVA), UNSPECIFIED MECHANISM: ICD-10-CM

## 2023-04-05 DIAGNOSIS — G45.9 TIA (TRANSIENT ISCHEMIC ATTACK): ICD-10-CM

## 2023-04-05 RX ORDER — CLOPIDOGREL BISULFATE 75 MG/1
75 TABLET ORAL DAILY
Qty: 90 TABLET | Refills: 1 | Status: SHIPPED | OUTPATIENT
Start: 2023-04-05

## 2023-04-05 NOTE — TELEPHONE ENCOUNTER
Last visit on 1/20/23; sent refills for clopidgorel 75mg daily to [facility].     Alexa Rodríguez, PharmD

## 2023-07-27 DIAGNOSIS — I63.9 CEREBROVASCULAR ACCIDENT (CVA), UNSPECIFIED MECHANISM: ICD-10-CM

## 2023-07-27 DIAGNOSIS — I10 ESSENTIAL HYPERTENSION: ICD-10-CM

## 2023-07-27 RX ORDER — HYDRALAZINE HYDROCHLORIDE 50 MG/1
75 TABLET, FILM COATED ORAL EVERY 12 HOURS
Qty: 90 TABLET | Refills: 3 | Status: SHIPPED | OUTPATIENT
Start: 2023-07-27

## 2023-07-27 NOTE — TELEPHONE ENCOUNTER
Caller: Gadiel Khan    Relationship: Self    Best call back number: [phone]    Requested Prescriptions:   Requested Prescriptions     Pending Prescriptions Disp Refills    hydrALAZINE (APRESOLINE) 50 MG tablet 90 tablet 3     Sig: Take 1.5 tablets by mouth Every 12 (Twelve) Hours.        Pharmacy where request should be sent: [place] [id number] [facility] - [phone]  - [phone]     Last office visit with prescribing clinician: 1/20/2023   Last telemedicine visit with prescribing clinician: Visit date not found   Next office visit with prescribing clinician: Visit date not found     Additional details provided by patient: COMPLETELY OUT (RAN OUT LAST NIGHT 07/26/23)    Does the patient have less than a 3 day supply:  [x] Yes  [] No    Would you like a call back once the refill request has been completed: [] Yes [x] No    If the office needs to give you a call back, can they leave a voicemail: [] Yes [x] No    Erika Baca   07/27/23 13:07 EDT

## 2023-08-02 ENCOUNTER — TELEPHONE (OUTPATIENT)
Dept: CARDIOLOGY | Facility: HOSPITAL | Age: 78
End: 2023-08-02
Payer: MEDICARE

## 2023-08-21 ENCOUNTER — DOCUMENTATION (OUTPATIENT)
Dept: CARDIOLOGY | Facility: HOSPITAL | Age: 78
End: 2023-08-21
Payer: MEDICARE

## 2023-08-21 NOTE — PROGRESS NOTES
Discussed preliminary Holter monitor results with patient.  Preliminary data with no evidence atrial fibrillation,, SVT, VT, pauses greater than 3 seconds.  Heart rate average 75, minimum 32, maximum 133.  Minimum heart rate appears to correlate with nocturnal second-degree AVB type I; no significant pauses as above.  Low burden PACs/PVC.    Patient does report some daytime fatigue over the past few months.  SBP generally well controlled 130-140s.  Reports recent lab work completed by PCP, unable to review and we will request recent records.    Discussed recommendation for sleep study and referral to sleep medicine.  Patient prefers to defer at this time; did recommend he discuss potential sleep study with PCP.  Continue with upcoming cardiology appointment as scheduled, contact heart valve clinic if needs arise in interim.

## 2023-08-30 ENCOUNTER — TELEPHONE (OUTPATIENT)
Dept: CARDIOLOGY | Facility: HOSPITAL | Age: 78
End: 2023-08-30
Payer: MEDICARE

## 2023-08-30 NOTE — TELEPHONE ENCOUNTER
----- Message from IVIS Gottlieb sent at 8/30/2023  3:33 PM EDT -----  Regarding: Lab work from PCP  Hello,     Could you request recent lab work from PCP.    Thanks

## 2023-11-06 ENCOUNTER — TELEPHONE (OUTPATIENT)
Dept: CARDIOLOGY | Facility: HOSPITAL | Age: 78
End: 2023-11-06
Payer: MEDICARE

## 2023-11-06 DIAGNOSIS — I63.9 CEREBROVASCULAR ACCIDENT (CVA), UNSPECIFIED MECHANISM: ICD-10-CM

## 2023-11-06 DIAGNOSIS — G45.9 TIA (TRANSIENT ISCHEMIC ATTACK): ICD-10-CM

## 2023-11-06 RX ORDER — CLOPIDOGREL BISULFATE 75 MG/1
75 TABLET ORAL DAILY
Qty: 90 TABLET | Refills: 1 | Status: SHIPPED | OUTPATIENT
Start: 2023-11-06

## 2023-11-06 NOTE — TELEPHONE ENCOUNTER
Caller: Gadiel Khan    Relationship: Self    Best call back number: [phone]    Requested Prescriptions:   Requested Prescriptions     Pending Prescriptions Disp Refills    clopidogrel (PLAVIX) 75 MG tablet 90 tablet 1     Sig: Take 1 tablet by mouth Daily.        Pharmacy where request should be sent: [place] [id number] [facility] - [phone] PH - [phone] FX     Last office visit with prescribing clinician: 1/20/2023   Last telemedicine visit with prescribing clinician: Visit date not found   Next office visit with prescribing clinician: Visit date not found     Additional details provided by patient: PT     Does the patient have less than a 3 day supply:  [x] Yes  [] No      Erika Stover Rep   11/06/23 08:41 EST

## 2023-11-06 NOTE — TELEPHONE ENCOUNTER
Okay to refill clopidogrel 75mg daily for 90-days with 1 refill per IVIS Varner.     Alexa Rodríguez, SnehaD